# Patient Record
(demographics unavailable — no encounter records)

---

## 2024-10-15 NOTE — REASON FOR VISIT
[FreeTextEntry1] : 59 year-old male HLD presents for followup.   Patient was last seen on 5/20/22 - Patient reports SOB with stairs.  Patient denies CP. Patient denies palpitations.  Patient denies lightheadedness.  Patient denies h/o syncope.  He is one 1 BP medication.  I advised patient to undergo an echocardiogram and a treadmill stress test.  Patient underwent an echocardiogram and it showed normal LV function without significant valvular pathology.  Patient underwent a treadmill stress test and completed 12 minutes of Cem protocol.  There was no ECG evidence of ischemia.  Following treadmill stress, there was no echocardiographic evidence of ischemia.   Patient underwent an echocardiogram  1/11/20 and it showed normal LV function without significant valvular pathology.    Patient underwent a treadmill stress test  1/11/20 and completed 12 minutes of Cem protocol.  There was no ECG evidence of ischemia.  Following treadmill stress, there was no echocardiographic evidence of ischemia.  Hypertensive response noted.

## 2024-10-15 NOTE — HISTORY OF PRESENT ILLNESS
[FreeTextEntry1] : 10/15/24 - I advised patient to undergo an echocardiogram and a treadmill stress test.    5/20/22 - Patient reports SOB with stairs.  Patient denies CP. Patient denies palpitations.  Patient denies lightheadedness.  Patient denies h/o syncope.  He is one 1 BP medication.  I advised patient to undergo an echocardiogram and a treadmill stress test.  Patient underwent an echocardiogram and it showed normal LV function without significant valvular pathology.  Patient underwent a treadmill stress test and completed 12 minutes of Cem protocol.  There was no ECG evidence of ischemia.  Following treadmill stress, there was no echocardiographic evidence of ischemia.   1/11/20 - Patient reports that he has CP with exertion. He is off all medication for one month due to elevated LFT. Patient is advised to resume Metoprolol ER 25 mg and that it's okay to be off of Atorvastatin and ASA 81 mg. I advised patient to undergo an echocardiogram and a treadmill stress test. FU in one year.